# Patient Record
Sex: MALE | Race: AMERICAN INDIAN OR ALASKA NATIVE
[De-identification: names, ages, dates, MRNs, and addresses within clinical notes are randomized per-mention and may not be internally consistent; named-entity substitution may affect disease eponyms.]

---

## 2018-07-23 ENCOUNTER — HOSPITAL ENCOUNTER (EMERGENCY)
Dept: HOSPITAL 5 - ED | Age: 43
Discharge: HOME | End: 2018-07-23
Payer: MEDICAID

## 2018-07-23 VITALS — DIASTOLIC BLOOD PRESSURE: 84 MMHG | SYSTOLIC BLOOD PRESSURE: 108 MMHG

## 2018-07-23 DIAGNOSIS — E86.0: Primary | ICD-10-CM

## 2018-07-23 DIAGNOSIS — R42: ICD-10-CM

## 2018-07-23 DIAGNOSIS — R55: ICD-10-CM

## 2018-07-23 LAB
ALBUMIN SERPL-MCNC: 3.8 G/DL (ref 3.9–5)
ALT SERPL-CCNC: 11 UNITS/L (ref 7–56)
BASOPHILS # (AUTO): 0 K/MM3 (ref 0–0.1)
BASOPHILS NFR BLD AUTO: 0.4 % (ref 0–1.8)
BENZODIAZEPINES SCREEN,URINE: (no result)
BILIRUB UR QL STRIP: (no result)
BLOOD UR QL VISUAL: (no result)
BUN SERPL-MCNC: 11 MG/DL (ref 9–20)
BUN/CREAT SERPL: 11 %
CALCIUM SERPL-MCNC: 8.6 MG/DL (ref 8.4–10.2)
EOSINOPHIL # BLD AUTO: 0.3 K/MM3 (ref 0–0.4)
EOSINOPHIL NFR BLD AUTO: 4.6 % (ref 0–4.3)
HCT VFR BLD CALC: 39.4 % (ref 35.5–45.6)
HEMOLYSIS INDEX: 13
HGB BLD-MCNC: 13 GM/DL (ref 11.8–15.2)
HYALINE CASTS #/AREA URNS LPF: 1 /LPF
LYMPHOCYTES # BLD AUTO: 1.1 K/MM3 (ref 1.2–5.4)
LYMPHOCYTES NFR BLD AUTO: 18.7 % (ref 13.4–35)
MCH RBC QN AUTO: 31 PG (ref 28–32)
MCHC RBC AUTO-ENTMCNC: 33 % (ref 32–34)
MCV RBC AUTO: 92 FL (ref 84–94)
METHADONE SCREEN,URINE: (no result)
MONOCYTES # (AUTO): 0.3 K/MM3 (ref 0–0.8)
MONOCYTES % (AUTO): 5.9 % (ref 0–7.3)
MUCOUS THREADS #/AREA URNS HPF: (no result) /HPF
OPIATE SCREEN,URINE: (no result)
PH UR STRIP: 5 [PH] (ref 5–7)
PLATELET # BLD: 189 K/MM3 (ref 140–440)
PROT UR STRIP-MCNC: (no result) MG/DL
RBC # BLD AUTO: 4.27 M/MM3 (ref 3.65–5.03)
RBC #/AREA URNS HPF: 1 /HPF (ref 0–6)
UROBILINOGEN UR-MCNC: 2 MG/DL (ref ?–2)
WBC #/AREA URNS HPF: 1 /HPF (ref 0–6)

## 2018-07-23 PROCEDURE — 82550 ASSAY OF CK (CPK): CPT

## 2018-07-23 PROCEDURE — 84443 ASSAY THYROID STIM HORMONE: CPT

## 2018-07-23 PROCEDURE — 84439 ASSAY OF FREE THYROXINE: CPT

## 2018-07-23 PROCEDURE — G0480 DRUG TEST DEF 1-7 CLASSES: HCPCS

## 2018-07-23 PROCEDURE — 36415 COLL VENOUS BLD VENIPUNCTURE: CPT

## 2018-07-23 PROCEDURE — 96361 HYDRATE IV INFUSION ADD-ON: CPT

## 2018-07-23 PROCEDURE — 80053 COMPREHEN METABOLIC PANEL: CPT

## 2018-07-23 PROCEDURE — 93010 ELECTROCARDIOGRAM REPORT: CPT

## 2018-07-23 PROCEDURE — 83880 ASSAY OF NATRIURETIC PEPTIDE: CPT

## 2018-07-23 PROCEDURE — 99284 EMERGENCY DEPT VISIT MOD MDM: CPT

## 2018-07-23 PROCEDURE — 85025 COMPLETE CBC W/AUTO DIFF WBC: CPT

## 2018-07-23 PROCEDURE — 81001 URINALYSIS AUTO W/SCOPE: CPT

## 2018-07-23 PROCEDURE — 80320 DRUG SCREEN QUANTALCOHOLS: CPT

## 2018-07-23 PROCEDURE — 96374 THER/PROPH/DIAG INJ IV PUSH: CPT

## 2018-07-23 PROCEDURE — 93005 ELECTROCARDIOGRAM TRACING: CPT

## 2018-07-23 PROCEDURE — 84484 ASSAY OF TROPONIN QUANT: CPT

## 2018-07-23 PROCEDURE — 80307 DRUG TEST PRSMV CHEM ANLYZR: CPT

## 2018-07-23 NOTE — EMERGENCY DEPARTMENT REPORT
HPI





- General


Chief Complaint: Syncope


Time Seen by Provider: 07/23/18 17:08





- HPI


HPI: 








The patient is a 42-year-old male who presents for evaluation of 

lightheadedness and syncope.  The patient reports insidious onset severe 

lightheadedness approximately 1-2 hours prior to arrival, exacerbated with 

position changes, improved with lying down and rest.  He states that shortly 

after onset of lightheadedness he passed out.  He denies trauma or injury to 

the head, headache, neck pain, neck stiffness, chest pain, dyspnea, abdominal 

pain, back pain, pain to the stiffness, rashes, lateralizing weakness, or other 

focal neurological deficit. 








ED Past Medical Hx





- Past Medical History


Previous Medical History?: No


Additional medical history: Hx Substance abuse clean since Feb. 2018





- Surgical History


Past Surgical History?: No





- Social History


Smoking Status: Unknown if ever smoked


Substance Use Type: None





ED Review of Systems


ROS: 


Stated complaint: SYNCOPE


Other details as noted in HPI





Constitutional: Reports dizziness and syncope denies: fever


ENT: denies: throat or neck pain


Respiratory: denies: cough, shortness of breath


Cardiovascular: denies: chest pain


Endocrine: denies unexplained weight loss or gain


Gastrointestinal: denies: abdominal pain, nausea


Genitourinary: denies: dysuria


Musculoskeletal: denies: leg swelling


Skin: denies: rash


Neurological: denies: headache


Hematological/Lymphatic: denies: easy bleeding or easy bruising  


Psych: denies sadness or hopelessness








Physical Exam





- Physical Exam


Vital Signs: 


 Vital Signs











  07/23/18 07/23/18 07/23/18





  16:50 16:55 17:00


 


Pulse Rate 45 L 46 L 45 L


 


Respiratory 19 10 L 12





Rate   


 


Blood Pressure 98/65  99/64


 


O2 Sat by Pulse 96  





Oximetry   











Physical Exam: 





General: well-nourished, well-developed, no acute distress


Head: Normocephalic, atraumatic


Eyes: normal sclera


ENT: Mucous membranes are pale and dry


Neck: No neck stiffness, no cervical adenopathy


Respiratory: Breath sounds equal bilaterally, no wheezing, rales, or rhonchi


Cardio: S1 and S2 present, no murmurs, rubs, gallops, capillary refill is 

delayed


Abdomen: Normoactive bowel sounds, soft abdomen, no rigidity, no guarding or 

rebound tenderness


Musc: No pitting edema


Skin: No rash


Neuro: alert oriented x4, normal cognition, speech normal, PERRL, EOM intact, 

no facial drooping, no uvula or tongue deviation on protrusion, no deficit with 

rotation of neck or shoulder shrug, no obvious gross motor deficit in the upper 

or lower extremities with flexion or extension at the shoulder, elbow, wrist, 

hip, knee, or ankle bilaterally, no obvious gross sensation deficit to crude 

touch or 2 pt discrimination, 2+ symmetric reflexes on DTR testing, no 

coordination deficit with finger-to-nose or heel-to-shin testing, Babinski 

downgoing, romberg negative, patient able to to ambulate without abnormal gait


Psych: Normal affect





ED Course


 Vital Signs











  07/23/18 07/23/18 07/23/18





  16:50 16:55 17:00


 


Pulse Rate 45 L 46 L 45 L


 


Respiratory 19 10 L 12





Rate   


 


Blood Pressure 98/65  99/64


 


O2 Sat by Pulse 96  





Oximetry   














ED Medical Decision Making





- Lab Data


Result diagrams: 


 07/23/18 17:47





 07/23/18 17:47





- Medical Decision Making








The patient was seen and examined by myself.  The patient is placed on a 

cardiac monitor and continuous pulse ox. On initial evaluation, the patient was 

found to be in no distress. Evaluation orders were placed.  EKG exhibited 

normal sinus bradycardia, normal axis, normal [place] interval and QT interval, no 

delta waves or Q waves, no T-wave changes or ST segment changes concerning for 

acute cardiac disease process, no arrhythmias. The patient is given 1 L normal 

saline fluid bolus for treatment of dehydration. Lab results are reassuring 

including normal electrolytes, thyroid function tests, renal function, 

hemoglobin, and BNP. The patient was reevaluated and reported that their 

symptoms were markedly improved.  The patient is stable for discharge with 

outpatient follow-up.  The patient is given follow-up and return instructions.  

The patient expressed understanding and agreed with the plan.  The patient is 

discharged in stable condition.





Critical care attestation.: 


If time is entered above; I have spent that time in minutes in the direct care 

of this critically ill patient, excluding procedure time.








ED Disposition


Clinical Impression: 


 Dehydration, Orthostatic syncope, Orthostatic dizziness





Disposition: DC-01 TO HOME OR SELFCARE


Is pt being admited?: No


Does the pt Need Aspirin: No


Condition: Stable


Instructions:  Dehydration (ED), Syncope (ED)


Referrals: 


PRIMARY CARE,MD [Primary Care Provider] - 3-5 Days


Time of Disposition: 18:37

## 2018-08-12 ENCOUNTER — HOSPITAL ENCOUNTER (EMERGENCY)
Dept: HOSPITAL 5 - ED | Age: 43
Discharge: LEFT BEFORE DISCHARGE | End: 2018-08-12
Payer: MEDICAID

## 2018-08-12 VITALS — SYSTOLIC BLOOD PRESSURE: 100 MMHG | DIASTOLIC BLOOD PRESSURE: 50 MMHG

## 2018-08-12 DIAGNOSIS — H92.02: Primary | ICD-10-CM

## 2018-08-12 DIAGNOSIS — Z53.21: ICD-10-CM

## 2019-02-25 ENCOUNTER — HOSPITAL ENCOUNTER (EMERGENCY)
Dept: HOSPITAL 5 - ED | Age: 44
Discharge: HOME | End: 2019-02-25
Payer: MEDICAID

## 2019-02-25 VITALS — SYSTOLIC BLOOD PRESSURE: 121 MMHG | DIASTOLIC BLOOD PRESSURE: 82 MMHG

## 2019-02-25 DIAGNOSIS — M54.5: ICD-10-CM

## 2019-02-25 DIAGNOSIS — J18.9: Primary | ICD-10-CM

## 2019-02-25 DIAGNOSIS — R19.7: ICD-10-CM

## 2019-02-25 DIAGNOSIS — G89.29: ICD-10-CM

## 2019-02-25 LAB
ALBUMIN SERPL-MCNC: 4.6 G/DL (ref 3.9–5)
ALT SERPL-CCNC: 13 UNITS/L (ref 7–56)
ANISOCYTOSIS BLD QL SMEAR: (no result)
BAND NEUTROPHILS # (MANUAL): 0 K/MM3
BUN SERPL-MCNC: 13 MG/DL (ref 9–20)
BUN/CREAT SERPL: 10 %
CALCIUM SERPL-MCNC: 9.2 MG/DL (ref 8.4–10.2)
HCT VFR BLD CALC: 40.2 % (ref 35.5–45.6)
HEMOLYSIS INDEX: 2
HGB BLD-MCNC: 14 GM/DL (ref 11.8–15.2)
MCHC RBC AUTO-ENTMCNC: 35 % (ref 32–34)
MCV RBC AUTO: 88 FL (ref 84–94)
MYELOCYTES # (MANUAL): 0 K/MM3
PLATELET # BLD: 155 K/MM3 (ref 140–440)
PROMYELOCYTES # (MANUAL): 0 K/MM3
RBC # BLD AUTO: 4.58 M/MM3 (ref 3.65–5.03)
TOTAL CELLS COUNTED BLD: 100

## 2019-02-25 PROCEDURE — 83690 ASSAY OF LIPASE: CPT

## 2019-02-25 PROCEDURE — 96367 TX/PROPH/DG ADDL SEQ IV INF: CPT

## 2019-02-25 PROCEDURE — 96375 TX/PRO/DX INJ NEW DRUG ADDON: CPT

## 2019-02-25 PROCEDURE — 85025 COMPLETE CBC W/AUTO DIFF WBC: CPT

## 2019-02-25 PROCEDURE — 85007 BL SMEAR W/DIFF WBC COUNT: CPT

## 2019-02-25 PROCEDURE — 74177 CT ABD & PELVIS W/CONTRAST: CPT

## 2019-02-25 PROCEDURE — 87400 INFLUENZA A/B EACH AG IA: CPT

## 2019-02-25 PROCEDURE — 82140 ASSAY OF AMMONIA: CPT

## 2019-02-25 PROCEDURE — 36415 COLL VENOUS BLD VENIPUNCTURE: CPT

## 2019-02-25 PROCEDURE — 96365 THER/PROPH/DIAG IV INF INIT: CPT

## 2019-02-25 PROCEDURE — 71045 X-RAY EXAM CHEST 1 VIEW: CPT

## 2019-02-25 PROCEDURE — 99284 EMERGENCY DEPT VISIT MOD MDM: CPT

## 2019-02-25 PROCEDURE — 87040 BLOOD CULTURE FOR BACTERIA: CPT

## 2019-02-25 PROCEDURE — 80053 COMPREHEN METABOLIC PANEL: CPT

## 2019-02-25 NOTE — XRAY REPORT
FINAL REPORT



EXAM:  XR CHEST 1V AP



HISTORY:  sepsis 



TECHNIQUE:  AP portable view of the chest



PRIORS:  None.



FINDINGS:  

Lines, tubes, and devices:  N/A



Lungs and pleura: Trachea is normal in position. Lungs are clear of infiltrate, pleural effusion, vas
cular congestion, or pneumothorax. 



Cardiomediastinal silhouette: Cardiac and mediastinal silhouettes are unremarkable.



Other: Bony structures are intact. Spurring on the undersurface of the right clavicle is consistent w
ith calcification of the coracoclavicular ligament. 



IMPRESSION:  

No acute cardiopulmonary process seen.

## 2019-02-25 NOTE — EMERGENCY DEPARTMENT REPORT
ED General Adult HPI





- General


Chief complaint: Nausea/Vomiting/Diarrhea


Stated complaint: FLU LIKE SYMPTOMS


Time Seen by Provider: 19 15:44


Source: patient, family


Mode of arrival: Ambulatory


Limitations: Physical Limitation





- History of Present Illness


Initial comments: 





Patient is a 43-year-old black male withpast medical history who is presenting 

with fever and body aches for the past 2 days.  Patient states he has had watery

diarrhea fever weakness as well.  Patient states body aches or cramping in 

nature.  Patient denies abdominal pain nausea or vomiting.  Patient states he 

has had chills as well.` Patient  also with a mild nonproductive cough


Severity scale (0 -10): 10


Associated Symptoms: cough (min and non productive), diaphoresis, fever/chills. 

denies: confusion, chest pain, loss of appetite, malaise, nausea/vomiting, rash,

seizure, shortness of breath, syncope, weakness





- Related Data


                                  Previous Rx's











 Medication  Instructions  Recorded  Last Taken  Type


 


ALBUTEROL Inhaler (OR & NICU) 2 puff IH QID PRN #1 inhalation 19 Unknown 

Rx





[ProAir HFA Inhaler]    


 


Benzonatate [Tessalon Perles] 100 mg PO Q8HR #10 capsule 19 Unknown Rx


 


Diphenoxylate/Atropine [Lomotil] 1 tab PO Q4H PRN #10 tablet 19 Unknown Rx


 


Ibuprofen [Motrin] 600 mg PO Q8H PRN #20 tablet 19 Unknown Rx


 


levoFLOXacin [Levaquin TAB] 500 mg PO QDAY #10 tablet 19 Unknown Rx


 


traMADol [Ultram] 50 mg PO Q6HR PRN #10 tablet 19 Unknown Rx











                                    Allergies











Allergy/AdvReac Type Severity Reaction Status Date / Time


 


No Known Allergies Allergy   Unverified 18 17:21














ED Review of Systems


ROS: 


Stated complaint: FLU LIKE SYMPTOMS


Other details as noted in HPI





Comment: All other systems reviewed and negative


Musculoskeletal: back pain (chronic)





ED Past Medical Hx





- Past Medical History


Hx Asthma: Yes


Additional medical history: Hx Substance abuse clean since 2018, 

incontinent of urine and stool,severe back pain





- Surgical History


Additional Surgical History: FX pelvis 2018





- Social History


Smoking Status: Former Smoker


Substance Use Type: None





- Medications


Home Medications: 


                                Home Medications











 Medication  Instructions  Recorded  Confirmed  Last Taken  Type


 


ALBUTEROL Inhaler (OR & NICU) 2 puff IH QID PRN #1 inhalation 19  Unknown 

Rx





[ProAir HFA Inhaler]     


 


Benzonatate [Tessalon Perles] 100 mg PO Q8HR #10 capsule 19  Unknown Rx


 


Diphenoxylate/Atropine [Lomotil] 1 tab PO Q4H PRN #10 tablet 19  Unknown 

Rx


 


Ibuprofen [Motrin] 600 mg PO Q8H PRN #20 tablet 19  Unknown Rx


 


levoFLOXacin [Levaquin TAB] 500 mg PO QDAY #10 tablet 19  Unknown Rx


 


traMADol [Ultram] 50 mg PO Q6HR PRN #10 tablet 19  Unknown Rx














ED Physical Exam





- General


Limitations: Physical Limitation


General appearance: alert, in no apparent distress





- Head


Head exam: Present: atraumatic, normocephalic





- Eye


Eye exam: Present: normal appearance, PERRL, EOMI





- ENT


ENT exam: Present: normal exam, normal orophraynx, mucous membranes moist





- Neck


Neck exam: Present: normal inspection, full ROM.  Absent: meningismus, 

lymphadenopathy





- Respiratory


Respiratory exam: Present: normal lung sounds bilaterally.  Absent: respiratory 

distress, wheezes, rales, rhonchi





- Cardiovascular


Cardiovascular Exam: Present: normal rhythm, tachycardia.  Absent: systolic 

murmur, diastolic murmur, rubs, gallop





- GI/Abdominal


GI/Abdominal exam: Present: soft, normal bowel sounds.  Absent: distended, 

tenderness, guarding, rebound, rigid





- Rectal


Rectal exam: Present: deferred





- Extremities Exam


Extremities exam: Present: normal inspection





- Back Exam


Back exam: Present: normal inspection





- Neurological Exam


Neurological exam: Present: alert, oriented X3





- Psychiatric


Psychiatric exam: Present: normal affect, normal mood





- Skin


Skin exam: Present: warm, dry, intact, normal color.  Absent: rash





ED Course


                                   Vital Signs











  19





  15:45 16:50 16:55


 


Temperature 102.7 F H  


 


Pulse Rate 117 H  


 


Respiratory 18 16 16





Rate   


 


Blood Pressure 101/67  


 


O2 Sat by Pulse 97  94





Oximetry   














  19





  17:01 17:30 17:31


 


Temperature   


 


Pulse Rate 93 H  99 H


 


Respiratory 18 16 14





Rate   


 


Blood Pressure   


 


O2 Sat by Pulse 95  94





Oximetry   














  19





  18:00 18:07 18:30


 


Temperature   


 


Pulse Rate  94 H 85


 


Respiratory 16 12 21





Rate   


 


Blood Pressure   104/65


 


O2 Sat by Pulse  95 88





Oximetry   














  19





  19:00


 


Temperature 


 


Pulse Rate 71


 


Respiratory 13





Rate 


 


Blood Pressure 121/82


 


O2 Sat by Pulse 98





Oximetry 














ED Medical Decision Making





- Lab Data


Result diagrams: 


                                 19 16:01





                                 19 16:01








                                   Lab Results











  19 Range/Units





  16:01 16:01 17:25 


 


WBC  3.9 L    (4.5-11.0)  K/mm3


 


RBC  4.58    (3.65-5.03)  M/mm3


 


Hgb  14.0    (11.8-15.2)  gm/dl


 


Hct  40.2    (35.5-45.6)  %


 


MCV  88    (84-94)  fl


 


MCH  31    (28-32)  pg


 


MCHC  35 H    (32-34)  %


 


RDW  14.6    (13.2-15.2)  %


 


Plt Count  155    (140-440)  K/mm3


 


Add Manual Diff  Complete    


 


Total Counted  100    


 


Seg Neutrophils %  Np    


 


Seg Neuts % (Manual)  92.0 H    (40.0-70.0)  %


 


Band Neutrophils %  0    %


 


Lymphocytes % (Manual)  6.0 L    (13.4-35.0)  %


 


Reactive Lymphs % (Man)  0    %


 


Monocytes % (Manual)  2.0    (0.0-7.3)  %


 


Eosinophils % (Manual)  0    (0.0-4.3)  %


 


Basophils % (Manual)  0    (0.0-1.8)  %


 


Metamyelocytes %  0    %


 


Myelocytes %  0    %


 


Promyelocytes %  0    %


 


Blast Cells %  0    %


 


Nucleated RBC %  Not Reportable    


 


Seg Neutrophils # Man  3.6    (1.8-7.7)  K/mm3


 


Band Neutrophils #  0.0    K/mm3


 


Lymphocytes # (Manual)  0.2 L    (1.2-5.4)  K/mm3


 


Abs React Lymphs (Man)  0.0    K/mm3


 


Monocytes # (Manual)  0.1    (0.0-0.8)  K/mm3


 


Eosinophils # (Manual)  0.0    (0.0-0.4)  K/mm3


 


Basophils # (Manual)  0.0    (0.0-0.1)  K/mm3


 


Metamyelocytes #  0.0    K/mm3


 


Myelocytes #  0.0    K/mm3


 


Promyelocytes #  0.0    K/mm3


 


Blast Cells #  0.0    K/mm3


 


WBC Morphology  Not Reportable    


 


Hypersegmented Neuts  Not Reportable    


 


Hyposegmented Neuts  Not Reportable    


 


Hypogranular Neuts  Not Reportable    


 


Smudge Cells  Not Reportable    


 


Toxic Granulation  Not Reportable    


 


Toxic Vacuolation  Not Reportable    


 


Dohle Bodies  Not Reportable    


 


Pelger-Huet Anomaly  Not Reportable    


 


Eugenia Rods  Not Reportable    


 


Platelet Estimate  Consistent w auto    


 


Clumped Platelets  Not Reportable    


 


Plt Clumps, EDTA  Not Reportable    


 


Large Platelets  Not Reportable    


 


Giant Platelets  Not Reportable    


 


Platelet Satelliting  Not Reportable    


 


Plt Morphology Comment  Not Reportable    


 


RBC Morphology  Not Reportable    


 


Dimorphic RBCs  Not Reportable    


 


Polychromasia  Not Reportable    


 


Hypochromasia  Not Reportable    


 


Poikilocytosis  Few    


 


Anisocytosis  1+    


 


Microcytosis  Not Reportable    


 


Macrocytosis  Not Reportable    


 


Spherocytes  Not Reportable    


 


Pappenheimer Bodies  Not Reportable    


 


Sickle Cells  Not Reportable    


 


Target Cells  Rare    


 


Tear Drop Cells  Not Reportable    


 


Ovalocytes  Rare    


 


Helmet Cells  Not Reportable    


 


Bentley-Mona Bodies  Not Reportable    


 


Cabot Rings  Not Reportable    


 


Holton Cells  Not Reportable    


 


Bite Cells  Not Reportable    


 


Crenated Cell  Not Reportable    


 


Elliptocytes  Not Reportable    


 


Acanthocytes (Spur)  Not Reportable    


 


Rouleaux  Not Reportable    


 


Hemoglobin C Crystals  Not Reportable    


 


Schistocytes  Not Reportable    


 


Malaria parasites  Not Reportable    


 


Connor Bodies  Not Reportable    


 


Hem Pathologist Commnt  No    


 


Sodium   134 L   (137-145)  mmol/L


 


Potassium   3.4 L   (3.6-5.0)  mmol/L


 


Chloride   97.5 L   ()  mmol/L


 


Carbon Dioxide   19 L   (22-30)  mmol/L


 


Anion Gap   21   mmol/L


 


BUN   13   (9-20)  mg/dL


 


Creatinine   1.3   (0.8-1.5)  mg/dL


 


Estimated GFR   > 60   ml/min


 


BUN/Creatinine Ratio   10   %


 


Glucose   119 H   ()  mg/dL


 


Lactic Acid     (0.7-2.0)  mmol/L


 


Calcium   9.2   (8.4-10.2)  mg/dL


 


Total Bilirubin   0.30   (0.1-1.2)  mg/dL


 


AST   25   (5-40)  units/L


 


ALT   13   (7-56)  units/L


 


Alkaline Phosphatase   64   ()  units/L


 


Total Protein   8.1   (6.3-8.2)  g/dL


 


Albumin   4.6   (3.9-5)  g/dL


 


Albumin/Globulin Ratio   1.3   %


 


Lipase   23   (13-60)  units/L


 


Influenza A (Rapid)    Negative  (Negative)  


 


Influenza B (Rapid)    Negative  (Negative)  














  19 Range/Units





  17:26 


 


WBC   (4.5-11.0)  K/mm3


 


RBC   (3.65-5.03)  M/mm3


 


Hgb   (11.8-15.2)  gm/dl


 


Hct   (35.5-45.6)  %


 


MCV   (84-94)  fl


 


MCH   (28-32)  pg


 


MCHC   (32-34)  %


 


RDW   (13.2-15.2)  %


 


Plt Count   (140-440)  K/mm3


 


Add Manual Diff   


 


Total Counted   


 


Seg Neutrophils %   


 


Seg Neuts % (Manual)   (40.0-70.0)  %


 


Band Neutrophils %   %


 


Lymphocytes % (Manual)   (13.4-35.0)  %


 


Reactive Lymphs % (Man)   %


 


Monocytes % (Manual)   (0.0-7.3)  %


 


Eosinophils % (Manual)   (0.0-4.3)  %


 


Basophils % (Manual)   (0.0-1.8)  %


 


Metamyelocytes %   %


 


Myelocytes %   %


 


Promyelocytes %   %


 


Blast Cells %   %


 


Nucleated RBC %   


 


Seg Neutrophils # Man   (1.8-7.7)  K/mm3


 


Band Neutrophils #   K/mm3


 


Lymphocytes # (Manual)   (1.2-5.4)  K/mm3


 


Abs React Lymphs (Man)   K/mm3


 


Monocytes # (Manual)   (0.0-0.8)  K/mm3


 


Eosinophils # (Manual)   (0.0-0.4)  K/mm3


 


Basophils # (Manual)   (0.0-0.1)  K/mm3


 


Metamyelocytes #   K/mm3


 


Myelocytes #   K/mm3


 


Promyelocytes #   K/mm3


 


Blast Cells #   K/mm3


 


WBC Morphology   


 


Hypersegmented Neuts   


 


Hyposegmented Neuts   


 


Hypogranular Neuts   


 


Smudge Cells   


 


Toxic Granulation   


 


Toxic Vacuolation   


 


Dohle Bodies   


 


Pelger-Huet Anomaly   


 


Eugenia Rods   


 


Platelet Estimate   


 


Clumped Platelets   


 


Plt Clumps, EDTA   


 


Large Platelets   


 


Giant Platelets   


 


Platelet Satelliting   


 


Plt Morphology Comment   


 


RBC Morphology   


 


Dimorphic RBCs   


 


Polychromasia   


 


Hypochromasia   


 


Poikilocytosis   


 


Anisocytosis   


 


Microcytosis   


 


Macrocytosis   


 


Spherocytes   


 


Pappenheimer Bodies   


 


Sickle Cells   


 


Target Cells   


 


Tear Drop Cells   


 


Ovalocytes   


 


Helmet Cells   


 


Bentley-Mona Bodies   


 


Cabot Rings   


 


Holton Cells   


 


Bite Cells   


 


Crenated Cell   


 


Elliptocytes   


 


Acanthocytes (Spur)   


 


Rouleaux   


 


Hemoglobin C Crystals   


 


Schistocytes   


 


Malaria parasites   


 


Connor Bodies   


 


Hem Pathologist Commnt   


 


Sodium   (137-145)  mmol/L


 


Potassium   (3.6-5.0)  mmol/L


 


Chloride   ()  mmol/L


 


Carbon Dioxide   (22-30)  mmol/L


 


Anion Gap   mmol/L


 


BUN   (9-20)  mg/dL


 


Creatinine   (0.8-1.5)  mg/dL


 


Estimated GFR   ml/min


 


BUN/Creatinine Ratio   %


 


Glucose   ()  mg/dL


 


Lactic Acid  1.40  (0.7-2.0)  mmol/L


 


Calcium   (8.4-10.2)  mg/dL


 


Total Bilirubin   (0.1-1.2)  mg/dL


 


AST   (5-40)  units/L


 


ALT   (7-56)  units/L


 


Alkaline Phosphatase   ()  units/L


 


Total Protein   (6.3-8.2)  g/dL


 


Albumin   (3.9-5)  g/dL


 


Albumin/Globulin Ratio   %


 


Lipase   (13-60)  units/L


 


Influenza A (Rapid)   (Negative)  


 


Influenza B (Rapid)   (Negative)  














- Radiology Data





[facility] 


[address] [place] [address] 





Cat Scan Report 


Signed 





Patient: PRASHANT CARLISLE MR#: [id number] 


: 1975 Acct:[id number] 


Age/Sex: 43 / M ADM Date: 19 


Loc: ED 


Attending Dr: 








Ordering Physician: KACEY CONNOR NP 


Date of Service: 19 


Procedure(s): CT abdomen pelvis w con 


Accession Number(s): [id number] 





cc: KACEY CONNOR NP 








FINAL REPORT 





EXAM: CT ABDOMEN PELVIS W CON 





HISTORY: abd pain n/v 





TECHNIQUE: Standard enhanced CT of the abdomen and pelvis. Coronal and sagittal 

reconstruction was


also performed. Delayed imaging was also obtained. 





Contrast: 100 mL Omnipaque 300 given IV. 





PRIORS: None. 





FINDINGS: 


Within the abdomen, the liver, spleen, pancreas, gallbladder, adrenal glands, 

and kidneys are 


unremarkable. No evidence for retroperitoneal or pelvic lymphadenopathy is seen.

 The bowel loops 


have normal caliber. No soft tissue mass, fluid collection, inflammatory change,

 or free air is seen


within the abdomen or pelvis. The appendix is not visualized. 





Within the pelvis, the bladder is unremarkable. The prostate is normal. No 

evidence for mass or 


lymphadenopathy is seen in the pelvis. 





Images through the upper abdomen include the lung bases which demonstrates 

patchy atelectasis or 


early infiltrate in each lung base posteriorly. 





Bony structures show no focal abnormalities and are intact. 





IMPRESSION: 


No acute intra-abdominal process noted. Patchy atelectasis or infiltrate in each

 lung base 


posteriorly. 











Transcribed By: [facility] 


Dictated By: DAVID HAMILTON MD 


Electronically Authenticated By: DAVID HAMILTON MD 


Signed Date/Time: 19 











DD/DT: 19 


TD/TT: 19





- Medical Decision Making





Patient is a 43-year-old  male who presented with fever diarrhea

 and cough.  X-rays was not conclusive however the patient on CT does have 

bilateral lower lung infiltrate.  No evidence of intra-abdominal pathology is 

seen.  Patient be started on Levaquin as well as meds for symptomatic relief 

patient is stable for discharge.


Critical Care Time: Yes


Critical care attestation.: 


If time is entered above; I have spent that time in minutes in the direct care 

of this critically ill patient, excluding procedure time.








ED Disposition


Clinical Impression: 


Pneumonia


Qualifiers:


 Pneumonia type: due to unspecified organism Laterality: bilateral Lung 

location: unspecified part of lung Qualified Code(s): J18.9 - Pneumonia, 

unspecified organism





Diarrhea


Qualifiers:


 Diarrhea type: unspecified type Qualified Code(s): R19.7 - Diarrhea, 

unspecified





Chronic back pain


Qualifiers:


 Back pain location: low back pain Back pain laterality: unspecified Sciatica 

presence: unspecified whether sciatica present Qualified Code(s): M54.5 - Low 

back pain; G89.29 - Other chronic pain





Disposition:  TO HOME OR SELFCARE


Is pt being admited?: No


Does the pt Need Aspirin: No


Condition: Stable


Instructions:  Bacterial Pneumonia (ED)


Referrals: 


[facility] [Other] - 3-5 Days


RAYRAY HODGSON MD [Staff Physician] - 3-5 Days


Time of Disposition: :56

## 2019-02-25 NOTE — EMERGENCY DEPARTMENT REPORT
Addendum entered and electronically signed by KACEY CONNOR NP  02/25/19 

15:50: 








Blank Doc





- Documentation


Documentation: 





Vital signs shows tachy and febrile.  Will call Sepsis code.  Will put sepsis 

orders in.





Original Note:








Blank Doc





- Documentation


Documentation: 





This is a 43-year-old male that presents with body aches, n/v/d, and weakness.  

Patient stated has a chronic pelvic/lower back condition which he is unable to 

control his bowel.





This initial assessment diagnostic orders/clinical plan/treatment(s) is/are 

subject to change based on patient's health status, clinical progression and 

re-assessment by fellow clinical providers in the ED.  Further treatment and 

workup at subsequent clinical providers discretion.  Patient/guardians urged not

to elope from ED s their condition may be serious if not clinically assessed and

managed.  Initial orders include:


1-Patient sent to [facility] for further evaluation and treatment


2- Labs


3- UA

## 2019-02-25 NOTE — CAT SCAN REPORT
FINAL REPORT



EXAM:  CT ABDOMEN PELVIS W CON



HISTORY:  abd pain n/v 



TECHNIQUE:  Standard enhanced CT of the abdomen and pelvis. Coronal and sagittal reconstruction was a
lso performed. Delayed imaging was also obtained. 



Contrast:  100 mL Omnipaque 300 given IV. 



PRIORS:  None.



FINDINGS:  

Within the abdomen, the liver, spleen, pancreas, gallbladder, adrenal glands, and kidneys are unremar
kable. No evidence for retroperitoneal or pelvic lymphadenopathy is seen.  The bowel loops have radha
l caliber. No soft tissue mass, fluid collection, inflammatory change, or free air is seen within the
 abdomen or pelvis. The appendix is not visualized.



Within the pelvis, the bladder is unremarkable. The prostate is normal. No evidence for mass or lymph
adenopathy is seen in the pelvis. 



Images through the upper abdomen include the lung bases which demonstrates patchy atelectasis or farnaz
y infiltrate in each lung base posteriorly.



Bony structures show no focal abnormalities and are intact.



IMPRESSION:  

No acute intra-abdominal process noted. Patchy atelectasis or infiltrate in each lung base posteriorl
y.